# Patient Record
Sex: FEMALE | Race: OTHER | HISPANIC OR LATINO | URBAN - METROPOLITAN AREA
[De-identification: names, ages, dates, MRNs, and addresses within clinical notes are randomized per-mention and may not be internally consistent; named-entity substitution may affect disease eponyms.]

---

## 2017-02-02 ENCOUNTER — EMERGENCY (EMERGENCY)
Facility: HOSPITAL | Age: 52
LOS: 1 days | Discharge: HOME | End: 2017-02-02
Admitting: EMERGENCY MEDICINE
Payer: MEDICAID

## 2017-02-02 VITALS
HEART RATE: 88 BPM | TEMPERATURE: 98 F | DIASTOLIC BLOOD PRESSURE: 88 MMHG | RESPIRATION RATE: 15 BRPM | SYSTOLIC BLOOD PRESSURE: 153 MMHG | WEIGHT: 119.93 LBS | HEIGHT: 60 IN | OXYGEN SATURATION: 100 %

## 2017-02-02 PROCEDURE — 29550 STRAPPING OF TOES: CPT | Mod: T1

## 2017-02-02 PROCEDURE — 99284 EMERGENCY DEPT VISIT MOD MDM: CPT | Mod: 25

## 2017-02-02 RX ORDER — IBUPROFEN 200 MG
600 TABLET ORAL ONCE
Qty: 0 | Refills: 0 | Status: COMPLETED | OUTPATIENT
Start: 2017-02-02 | End: 2017-02-02

## 2017-02-02 RX ADMIN — Medication 600 MILLIGRAM(S): at 23:51

## 2017-02-02 NOTE — ED PROVIDER NOTE - PLAN OF CARE
Rest, ice and elevate  Use anamaria tape and hard shoe as directed   Follow up with podiatry this week, referral list provided  Return to the ER for any emergent or worsening concerns Rest, ice and elevate  Ibuprofen 600mg every 6 hours with food as needed   Use buddy tape and hard shoe as directed   Follow up with podiatry this week, referral list provided  Return to the ER for any emergent or worsening concerns

## 2017-02-02 NOTE — ED PROVIDER NOTE - CARE PLAN
Principal Discharge DX:	Toe contusion  Instructions for follow-up, activity and diet:	Rest, ice and elevate  Use anamaria tape and hard shoe as directed   Follow up with podiatry this week, referral list provided  Return to the ER for any emergent or worsening concerns Principal Discharge DX:	Toe contusion  Instructions for follow-up, activity and diet:	Rest, ice and elevate  Ibuprofen 600mg every 6 hours with food as needed   Use buddy tape and hard shoe as directed   Follow up with podiatry this week, referral list provided  Return to the ER for any emergent or worsening concerns

## 2017-02-02 NOTE — ED PROVIDER NOTE - OBJECTIVE STATEMENT
50 yo F with PmHx asthma and ovarian ca remission 7 yrs presenting with left toe pain after slipping earlier today. States she was wearing slippers when she slipped on water and toes got under casing pain that has been worsening throughout the day. noticed 2nd toe has most pain and developing ecchymosis to area. Denies laceration, inability to ambulate trying OTC meds, fevers, head pain, LOC or other acute complaints.

## 2017-02-02 NOTE — ED ADULT TRIAGE NOTE - CHIEF COMPLAINT QUOTE
Pt ambulatory to triage s/p trip and fall c/o L 2nd, 3rd and 4th toe pain, worse with walking. Bruising noted to 2nd toe. States she did not take anything at home for pain

## 2017-02-02 NOTE — ED PROVIDER NOTE - LOWER EXTREMITY EXAM, LEFT
2nd tow tih pain and decreased ROM, ecchymosis proximal to the nail, no warmth or cellulitis or abrasion noted, no obvious deformity or step off noted, the rest of the foot in atraumatic and non tender

## 2017-02-03 PROCEDURE — 73630 X-RAY EXAM OF FOOT: CPT | Mod: 26,LT

## 2017-02-03 RX ADMIN — Medication 600 MILLIGRAM(S): at 00:30

## 2017-02-03 NOTE — ED PROCEDURE NOTE - NS ED PERI VASCULAR NEG
no cyanosis of extremity/capillary refill time < 2 seconds/no paresthesia/no swelling/fingers/toes warm to touch

## 2017-02-03 NOTE — ED PROCEDURE NOTE - CPROC ED POST PROC CARE GUIDE1
Instructed patient/caregiver regarding signs and symptoms of infection./Instructed patient/caregiver to follow-up with primary care physician./Elevate the injured extremity as instructed./Verbal/written post procedure instructions were given to patient/caregiver./Keep the cast/splint/dressing clean and dry.

## 2017-06-03 ENCOUNTER — EMERGENCY (EMERGENCY)
Facility: HOSPITAL | Age: 52
LOS: 1 days | Discharge: HOME | End: 2017-06-03
Attending: EMERGENCY MEDICINE | Admitting: EMERGENCY MEDICINE
Payer: MEDICAID

## 2017-06-03 VITALS
OXYGEN SATURATION: 98 % | SYSTOLIC BLOOD PRESSURE: 114 MMHG | RESPIRATION RATE: 16 BRPM | DIASTOLIC BLOOD PRESSURE: 78 MMHG | TEMPERATURE: 100 F | HEART RATE: 95 BPM

## 2017-06-03 VITALS
TEMPERATURE: 99 F | SYSTOLIC BLOOD PRESSURE: 147 MMHG | RESPIRATION RATE: 16 BRPM | HEART RATE: 110 BPM | DIASTOLIC BLOOD PRESSURE: 97 MMHG | OXYGEN SATURATION: 99 %

## 2017-06-03 LAB
ALBUMIN SERPL ELPH-MCNC: 4.1 G/DL — SIGNIFICANT CHANGE UP (ref 3.3–5)
ALP SERPL-CCNC: 77 U/L — SIGNIFICANT CHANGE UP (ref 40–120)
ALT FLD-CCNC: 16 U/L — SIGNIFICANT CHANGE UP (ref 4–33)
APPEARANCE UR: CLEAR — SIGNIFICANT CHANGE UP
AST SERPL-CCNC: 25 U/L — SIGNIFICANT CHANGE UP (ref 4–32)
BASOPHILS # BLD AUTO: 0.01 K/UL — SIGNIFICANT CHANGE UP (ref 0–0.2)
BASOPHILS NFR BLD AUTO: 0.1 % — SIGNIFICANT CHANGE UP (ref 0–2)
BILIRUB SERPL-MCNC: 0.4 MG/DL — SIGNIFICANT CHANGE UP (ref 0.2–1.2)
BILIRUB UR-MCNC: NEGATIVE — SIGNIFICANT CHANGE UP
BLOOD UR QL VISUAL: HIGH
BUN SERPL-MCNC: 15 MG/DL — SIGNIFICANT CHANGE UP (ref 7–23)
CALCIUM SERPL-MCNC: 9.1 MG/DL — SIGNIFICANT CHANGE UP (ref 8.4–10.5)
CHLORIDE SERPL-SCNC: 100 MMOL/L — SIGNIFICANT CHANGE UP (ref 98–107)
CO2 SERPL-SCNC: 25 MMOL/L — SIGNIFICANT CHANGE UP (ref 22–31)
COLOR SPEC: YELLOW — SIGNIFICANT CHANGE UP
CREAT SERPL-MCNC: 0.89 MG/DL — SIGNIFICANT CHANGE UP (ref 0.5–1.3)
EOSINOPHIL # BLD AUTO: 0 K/UL — SIGNIFICANT CHANGE UP (ref 0–0.5)
EOSINOPHIL NFR BLD AUTO: 0 % — SIGNIFICANT CHANGE UP (ref 0–6)
GLUCOSE SERPL-MCNC: 107 MG/DL — HIGH (ref 70–99)
GLUCOSE UR-MCNC: NEGATIVE — SIGNIFICANT CHANGE UP
HCT VFR BLD CALC: 35.7 % — SIGNIFICANT CHANGE UP (ref 34.5–45)
HGB BLD-MCNC: 11.8 G/DL — SIGNIFICANT CHANGE UP (ref 11.5–15.5)
IMM GRANULOCYTES NFR BLD AUTO: 0.5 % — SIGNIFICANT CHANGE UP (ref 0–1.5)
KETONES UR-MCNC: SIGNIFICANT CHANGE UP
LEUKOCYTE ESTERASE UR-ACNC: SIGNIFICANT CHANGE UP
LIDOCAIN IGE QN: 22.3 U/L — SIGNIFICANT CHANGE UP (ref 7–60)
LYMPHOCYTES # BLD AUTO: 0.77 K/UL — LOW (ref 1–3.3)
LYMPHOCYTES # BLD AUTO: 6.1 % — LOW (ref 13–44)
MCHC RBC-ENTMCNC: 31.6 PG — SIGNIFICANT CHANGE UP (ref 27–34)
MCHC RBC-ENTMCNC: 33.1 % — SIGNIFICANT CHANGE UP (ref 32–36)
MCV RBC AUTO: 95.7 FL — SIGNIFICANT CHANGE UP (ref 80–100)
MONOCYTES # BLD AUTO: 0.49 K/UL — SIGNIFICANT CHANGE UP (ref 0–0.9)
MONOCYTES NFR BLD AUTO: 3.9 % — SIGNIFICANT CHANGE UP (ref 2–14)
MUCOUS THREADS # UR AUTO: SIGNIFICANT CHANGE UP
NEUTROPHILS # BLD AUTO: 11.2 K/UL — HIGH (ref 1.8–7.4)
NEUTROPHILS NFR BLD AUTO: 89.4 % — HIGH (ref 43–77)
NITRITE UR-MCNC: NEGATIVE — SIGNIFICANT CHANGE UP
NON-SQ EPI CELLS # UR AUTO: <1 — SIGNIFICANT CHANGE UP
PH UR: 7 — SIGNIFICANT CHANGE UP (ref 4.6–8)
PLATELET # BLD AUTO: 185 K/UL — SIGNIFICANT CHANGE UP (ref 150–400)
PMV BLD: 11.1 FL — SIGNIFICANT CHANGE UP (ref 7–13)
POTASSIUM SERPL-MCNC: 3.4 MMOL/L — LOW (ref 3.5–5.3)
POTASSIUM SERPL-SCNC: 3.4 MMOL/L — LOW (ref 3.5–5.3)
PROT SERPL-MCNC: 7.4 G/DL — SIGNIFICANT CHANGE UP (ref 6–8.3)
PROT UR-MCNC: 100 — HIGH
RBC # BLD: 3.73 M/UL — LOW (ref 3.8–5.2)
RBC # FLD: 12.8 % — SIGNIFICANT CHANGE UP (ref 10.3–14.5)
RBC CASTS # UR COMP ASSIST: SIGNIFICANT CHANGE UP (ref 0–?)
SODIUM SERPL-SCNC: 142 MMOL/L — SIGNIFICANT CHANGE UP (ref 135–145)
SP GR SPEC: 1.03 — SIGNIFICANT CHANGE UP (ref 1–1.03)
SQUAMOUS # UR AUTO: SIGNIFICANT CHANGE UP
UROBILINOGEN FLD QL: NORMAL E.U. — SIGNIFICANT CHANGE UP (ref 0.1–0.2)
WBC # BLD: 12.53 K/UL — HIGH (ref 3.8–10.5)
WBC # FLD AUTO: 12.53 K/UL — HIGH (ref 3.8–10.5)
WBC UR QL: SIGNIFICANT CHANGE UP (ref 0–?)

## 2017-06-03 PROCEDURE — 71020: CPT | Mod: 26

## 2017-06-03 PROCEDURE — 99284 EMERGENCY DEPT VISIT MOD MDM: CPT

## 2017-06-03 RX ORDER — ACETAMINOPHEN 500 MG
650 TABLET ORAL ONCE
Qty: 0 | Refills: 0 | Status: COMPLETED | OUTPATIENT
Start: 2017-06-03 | End: 2017-06-03

## 2017-06-03 RX ORDER — SODIUM CHLORIDE 9 MG/ML
1000 INJECTION INTRAMUSCULAR; INTRAVENOUS; SUBCUTANEOUS ONCE
Qty: 0 | Refills: 0 | Status: COMPLETED | OUTPATIENT
Start: 2017-06-03 | End: 2017-06-03

## 2017-06-03 RX ORDER — KETOROLAC TROMETHAMINE 30 MG/ML
15 SYRINGE (ML) INJECTION ONCE
Qty: 0 | Refills: 0 | Status: DISCONTINUED | OUTPATIENT
Start: 2017-06-03 | End: 2017-06-03

## 2017-06-03 RX ADMIN — Medication 15 MILLIGRAM(S): at 16:48

## 2017-06-03 RX ADMIN — Medication 650 MILLIGRAM(S): at 13:44

## 2017-06-03 RX ADMIN — SODIUM CHLORIDE 1000 MILLILITER(S): 9 INJECTION INTRAMUSCULAR; INTRAVENOUS; SUBCUTANEOUS at 13:44

## 2017-06-03 RX ADMIN — Medication 15 MILLIGRAM(S): at 16:20

## 2017-06-03 NOTE — ED ADULT TRIAGE NOTE - CHIEF COMPLAINT QUOTE
Pt c/o sore throat, difficulty swallowing, body ache, headache, weakness and N/V since Thursday.  Pt took tylenol at 6am

## 2017-06-03 NOTE — ED PROVIDER NOTE - OBJECTIVE STATEMENT
52 yo F with history of asthma and ovarian cancer not currently on treatment presenting with cough, fevers, chills, sore throat x 2 days. Denies recent travel or sick contacts. Denies rhinorrhea, otorrhea, otalgia,  constipation, diarrhea, chest pain, shortness of breath or changes in urinary habits. Pt also endorses one episode of vomiting.

## 2017-06-03 NOTE — ED ADULT NURSE NOTE - OBJECTIVE STATEMENT
Pt to intake 12, a&ox3, c/o flu like symptoms with fevers, chills, generalized body aches x 1 week. Denies any recent sick contacts. Labs drawn and sent, IVL placed. Meds given as ordered. Daughter by bedside. EKG performed. Pt to RW.

## 2017-06-03 NOTE — ED PROVIDER NOTE - ATTENDING CONTRIBUTION TO CARE
52 yo F with history of asthma and ovarian cancer not currently on treatment presenting with cough, fevers, chills, sore throat x 2 days. Denies recent travel or sick contacts. Denies rhinorrhea, otorrhea, otalgia,  constipation, diarrhea, chest pain, shortness of breath or changes in urinary habits. Also c/o palpitations; PE: alert VSS; afebrile; pharynx benign; minimal rhinitis; neck supple; chest clear; cor RR; abd soft non tender ext without edema; Imp: viral syndrome; will r/o bacterial etiology with CXR and U/A; EKG for palpitations; discharge home if w/u negative and improved symptoms.

## 2017-11-12 NOTE — ED PROVIDER NOTE - RESPIRATORY, MLM
Continue antibiotics as prescribed Take nausea medicines as prescribed Drink plenty of fluids Call and arrange follow-up with your primary care physician Breath sounds clear and equal bilaterally.

## 2022-08-08 NOTE — ED ADULT NURSE NOTE - PAIN RATING/NUMBER SCALE (0-10): REST
8 Acitretin Counseling:  I discussed with the patient the risks of acitretin including but not limited to hair loss, dry lips/skin/eyes, liver damage, hyperlipidemia, depression/suicidal ideation, photosensitivity.  Serious rare side effects can include but are not limited to pancreatitis, pseudotumor cerebri, bony changes, clot formation/stroke/heart attack.  Patient understands that alcohol is contraindicated since it can result in liver toxicity and significantly prolong the elimination of the drug by many years.

## 2025-01-16 NOTE — ED PROVIDER NOTE - DURATION
[facility]  Hematology Clinic  Follow-up Visit Note    CC:  B-cell lymphoproliferative disorder  Referring provider:  Ren Maloney    HISTORY OF PRESENT ILLNESS:  Yuliana Lou is a 85 year old female recently diagnosed with B-cell lymphoproliferative disorder, who presents today in initial evaluation and follow-up.  As noted in our initial visit note, she presented initially for evaluation of lymphocytosis, with lymphocyte count ranging 4.9-7.4 through June 2015. She had undergone repeat CBC as part of the perioperative evaluation in the setting of hip arthroplasty, and subsequently met with us regarding proceeding with further testing for her leukocytosis. I had obtained peripheral blood flow cytometry, results from which suggested an underlying B-cell lymphoproliferative disorder with kappa light chain restriction, with immunophenotype not typical for underlying CLL, perhaps consistent with an underlying marginal zone lymphoma.    She was recommended to initiate Rituximab weekly x 4 doses, which she did begin on 3/13/2024. During her first infusion, she did experience infusion reaction with symptoms of SOB, upset stomach and rigors. Symptoms eventually subsided with use of emergency medications, but patient persistently had borderline oxygen saturations and the small remaining amount of Rituximab was not given as 3rd re challenge.      Melissa is seen today for follow-up.  She is feeling fairly, and specifically denies ongoing active B symptoms.  She is currently recovering from bronchitis.  She denies any other chest discomfort, shortness of breath presently, and has questions regarding recommendations for further follow-up.    Patient Active Problem List    Diagnosis Date Noted    History of ophthalmic migraine 10/18/2023     Priority: Medium    Allergic conjunctivitis of both eyes 10/18/2023     Priority: Medium    Nuclear sclerotic cataract of both eyes 09/07/2017     Priority: Medium     Paroxysmal atrial fibrillation  (CMD) 10/01/2024     Priority: Low     Onset while getting chemotherapy.      SI (sacroiliac) joint dysfunction 08/19/2024     Priority: Low    Dry eye syndrome of both eyes 10/18/2023     Priority: Low    Lumbar radiculopathy 04/24/2023     Priority: Low    Presbyopia of both eyes 10/13/2022     Priority: Low    Low grade B cell lymphoproliferative disorder  (CMD) 12/12/2017     Priority: Low    Status post total hip replacement, right 04/21/2016     Priority: Low    Personal history of malignant melanoma 04/21/2016     Priority: Low    DDD (degenerative disc disease), lumbar 12/04/2015     Priority: Low    Lumbar stenosis without neurogenic claudication 11/20/2015     Priority: Low    Lactose intolerance 06/23/2014     Priority: Low    Choroidal nevus of both eyes 05/09/2014     Priority: Low    Asthma (CMD) 09/16/2013     Priority: Low    Cancer of skin 09/16/2013     Priority: Low    Chronic low back pain 09/16/2013     Priority: Low    Migraine 09/16/2013     Priority: Low       Past Medical History:   Diagnosis Date    Asthma (CMD)     Cataract     Chronic pain     DDD (degenerative disc disease), lumbar 12/04/2015    Keratosis, actinic     Malignant melanoma  (CMD)      Past Surgical History:   Procedure Laterality Date    Colonoscopy diagnostic  01/01/2005    Colonoscopy, Dx, through stoma    Epidural steroid injection   08/19/2024    Ruel; also: 4/24/24, 12/20/23, 8/19/24    Thyroid surgery      nodule-benign    Tonsillectomy and adenoidectomy      age 14    Total hip arthroplasty Right 04/21/2016     Family History   Problem Relation Age of Onset    Systemic Lupus Erythematosus Mother     Heart disease Father     Diabetes Father     Cancer Sister         ovarian cancer    Cancer Paternal Aunt      ALLERGIES:   Allergen Reactions    Adhesive   (Environmental) Other (See Comments)     Itching  Prolonged bandage use    Cat Dander Other (See Comments)     Sneezing and runny  nose    Latex   (Environmental) RASH    Mold   (Environmental) Other (See Comments)     Runny nose and sneezing    Peanuts [Peanut - Dietary Use Only] Other (See Comments)     Causes asthma attack (only if ingested)    Pollen Other (See Comments)     Sneezing and runny nose       Social History     Social History Narrative    , lives in [place]. No tobacco, approximately one glass of wine per evening. Leads an active lifestyle.       Current Outpatient Medications   Medication Sig Dispense Refill    predniSONE (DELTASONE) 20 MG tablet Take 2 tablets by mouth daily for 5 days. (Patient not taking: Reported on 1/16/2025) 10 tablet 0    albuterol 108 (90 Base) MCG/ACT inhaler Inhale 2 puffs into the lungs every 4 hours as needed for Wheezing or Shortness of Breath. 1 each 0    cefUROXime (CEFTIN) 250 MG tablet Take 1 tablet by mouth in the morning and 1 tablet in the evening. Do all this for 7 days. 14 tablet 0    Calcium Carbonate Antacid (CALCIUM CARBONATE PO) Take by mouth daily.      Eliquis 2.5 MG Tab TAKE 1 TABLET BY MOUTH EVERY 12 HOURS 60 tablet 11    metoPROLOL tartrate (LOPRESSOR) 25 MG tablet Take 1 tablet by mouth in the morning and 1 tablet in the evening. 60 tablet 11    Acetaminophen (TYLENOL PO) Take by mouth as needed.      NON FORMULARY as needed.      ALPHA LIPOIC ACID PO Take by mouth daily.      Turmeric (CURCUMIN 95 PO) Take 375 mg by mouth daily.      diphenhydrAMINE HCl, Sleep, (SIMPLY SLEEP PO) Take 0.5 tablets by mouth nightly.      Loperamide HCl (IMODIUM A-D PO) Take by mouth as needed.      ePHEDrine-guaiFENesin (PRIMATENE ASTHMA PO) Take by mouth as needed. OTC inhaler      Probiotic Product (PROBIOTIC PO) Take by mouth daily.      Glucosamine-Chondroitin (GLUCOSAMINE CHONDR COMPLEX PO) Take 1 tablet by mouth daily.      Cholecalciferol (VITAMIN D PO) Take 2,000 Int'l Units by mouth daily.       CINNAMON PO Take by mouth daily.      Lactase (LACTAID PO) Take by mouth as needed.       Multiple Vitamin (MULTI-VITAMIN) tablet Take 1 tablet by mouth daily.       No current facility-administered medications for this visit.       REVIEW OF SYSTEMS:  Negative except as mentioned above as per HPI      ECOG [01/16/25 1540]   ECOG Performance Status 1      General Appearance - Alert, well appearing, and in no distress.  Mental Status - Alert, oriented to person, place, and time.   Eyes - Pupils equal and reactive, extraocular eye movements intact.   Mouth - Mucous membranes moist, pharynx normal without lesions.  HeartL RRR  Lungs: CTA bilaterally      Labs reviewed and discussed with patient:  WBC (K/mcL)   Date Value   01/16/2025 14.4 (H)     RBC (mil/mcL)   Date Value   01/16/2025 4.67     HCT (%)   Date Value   01/16/2025 44.9     HGB (g/dL)   Date Value   01/16/2025 14.9     PLT (K/mcL)   Date Value   01/16/2025 438      Sodium (mmol/L)   Date Value   01/16/2025 139     Potassium (mmol/L)   Date Value   01/16/2025 4.2     Chloride (mmol/L)   Date Value   01/16/2025 100     Glucose (mg/dL)   Date Value   01/16/2025 106 (H)     Calcium (mg/dL)   Date Value   01/16/2025 9.9     Carbon Dioxide (mmol/L)   Date Value   01/16/2025 30     BUN (mg/dL)   Date Value   01/16/2025 16     Creatinine (mg/dL)   Date Value   01/16/2025 0.61       GOT/AST (Units/L)   Date Value   01/16/2025 17     GPT/ALT (Units/L)   Date Value   01/16/2025 41     No results found for: \"GGTP\"  Alkaline Phosphatase (Units/L)   Date Value   01/16/2025 81     Bilirubin, Total (mg/dL)   Date Value   01/16/2025 0.5     LD, Total (Units/L)   Date Value   04/04/2024 156     Imaging:  CT C/A/P, 12/9/16 -      IMPRESSION:  1. Dominant left thyroid mass measuring 4 cm. Recommend further  characterization with ultrasound, possible biopsy.  2. Elsewhere, no other mass or adenopathy.    CT abdomen pelvis with contrast, 4/29/22 -   IMPRESSION:      1. Right obturator ring fracture with hematoma involving the right  obturator internus and  externus. Minimal displacement. Minimal fluid  between the bladder and the obturator internist is probably hematoma as  well. No free fluid in the abdomen or pelvis. No free air.  2. Subtle right sacral fracture is suspected.  3. Additional findings as described.    PET/CT, 2/14/24 -  IMPRESSION:      1.   Enlarging predominantly abdominopelvic lymph nodes with uptake similar  to blood pool, which given history, favor low-grade lymphoma. Normal  splenic size/uptake. Mild diffuse nonspecific marrow uptake.  2.   Focal descending colonic uptake, potentially  inflammatory/diverticular, but could also be a polyp or lesion.  3.   Large left thyroid nodule with low-grade uptake, present since 2016.  Clinical correlation recommended.       ASSESSMENT AND PLAN:  In summary, Yuliana Lou is a 85 year old female with diagnosis of B-cell lymphoproliferative neoplasm, who presents today in follow-up.    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   --she had infusion reactions with dose #1 including SOB, stomach upset, and rigors, and was able to complete full course of treatment ultimately with slower infusion rate  --for now, she appears to be in hematologic remission, and I will therefore proceed with observation alone, with follow-up with me next in approximately 6 months time  --I did advise her to monitor for B symptoms, although presently she remains fairly asymptomatic    #Thyroid mass  --Biopsy without evidence of malignancy, pathology consistent with colloid nodule.  --We will repeat periodic surveillance thyroid US    Al Garcia MD        ----------------------------------------------------------------------------------------------------------------------        today